# Patient Record
Sex: MALE | Race: WHITE | NOT HISPANIC OR LATINO | Employment: FULL TIME | URBAN - METROPOLITAN AREA
[De-identification: names, ages, dates, MRNs, and addresses within clinical notes are randomized per-mention and may not be internally consistent; named-entity substitution may affect disease eponyms.]

---

## 2018-12-06 ENCOUNTER — OFFICE VISIT - HEALTHEAST (OUTPATIENT)
Dept: FAMILY MEDICINE | Facility: CLINIC | Age: 28
End: 2018-12-06

## 2018-12-06 DIAGNOSIS — R50.9 FEVER: ICD-10-CM

## 2018-12-06 DIAGNOSIS — K52.9 ACUTE COLITIS: ICD-10-CM

## 2018-12-06 LAB
ANION GAP SERPL CALCULATED.3IONS-SCNC: 13 MMOL/L (ref 5–18)
BASOPHILS # BLD AUTO: 0 THOU/UL (ref 0–0.2)
BASOPHILS NFR BLD AUTO: 0 % (ref 0–2)
BUN SERPL-MCNC: 17 MG/DL (ref 8–22)
CHLORIDE BLD-SCNC: 99 MMOL/L (ref 98–107)
CO2 SERPL-SCNC: 25 MMOL/L (ref 22–31)
CREAT SERPL-MCNC: 1.1 MG/DL (ref 0.8–1.5)
DEPRECATED S PYO AG THROAT QL EIA: NORMAL
EOSINOPHIL # BLD AUTO: 0 THOU/UL (ref 0–0.4)
EOSINOPHIL NFR BLD AUTO: 0 % (ref 0–6)
ERYTHROCYTE [DISTWIDTH] IN BLOOD BY AUTOMATED COUNT: 12.1 % (ref 11–14.5)
FLUAV AG SPEC QL IA: NORMAL
FLUBV AG SPEC QL IA: NORMAL
GLUCOSE BLD-MCNC: 111 MG/DL (ref 70–125)
HCT VFR BLD AUTO: 44.8 % (ref 40–54)
HGB BLD-MCNC: 16.2 G/DL (ref 14–18)
LYMPHOCYTES # BLD AUTO: 0.7 THOU/UL (ref 0.8–4.4)
LYMPHOCYTES NFR BLD AUTO: 7 % (ref 20–40)
MCH RBC QN AUTO: 32.3 PG (ref 27–34)
MCHC RBC AUTO-ENTMCNC: 36.2 G/DL (ref 32–36)
MCV RBC AUTO: 89 FL (ref 80–100)
MONOCYTES # BLD AUTO: 0.9 THOU/UL (ref 0–0.9)
MONOCYTES NFR BLD AUTO: 9 % (ref 2–10)
NEUTROPHILS # BLD AUTO: 8 THOU/UL (ref 2–7.7)
NEUTROPHILS NFR BLD AUTO: 83 % (ref 50–70)
PLATELET # BLD AUTO: 151 THOU/UL (ref 140–440)
PMV BLD AUTO: 8.3 FL (ref 8.5–12.5)
POTASSIUM BLD-SCNC: 3.9 MMOL/L (ref 3.5–5.5)
RBC # BLD AUTO: 5.01 MILL/UL (ref 4.4–6.2)
SODIUM SERPL-SCNC: 137 MMOL/L (ref 136–145)
WBC: 9.6 THOU/UL (ref 4–11)

## 2018-12-07 LAB — GROUP A STREP BY PCR: NORMAL

## 2018-12-10 ENCOUNTER — OFFICE VISIT - HEALTHEAST (OUTPATIENT)
Dept: FAMILY MEDICINE | Facility: CLINIC | Age: 28
End: 2018-12-10

## 2018-12-10 DIAGNOSIS — H65.93 FLUID LEVEL BEHIND TYMPANIC MEMBRANE OF BOTH EARS: ICD-10-CM

## 2018-12-10 DIAGNOSIS — K52.9 COLITIS: ICD-10-CM

## 2018-12-10 ASSESSMENT — MIFFLIN-ST. JEOR: SCORE: 1701.07

## 2019-01-31 ENCOUNTER — COMMUNICATION - HEALTHEAST (OUTPATIENT)
Dept: ALLERGY | Facility: CLINIC | Age: 29
End: 2019-01-31

## 2020-10-01 ENCOUNTER — HOSPITAL ENCOUNTER (EMERGENCY)
Facility: CLINIC | Age: 30
Discharge: HOME | End: 2020-10-01
Attending: EMERGENCY MEDICINE | Admitting: EMERGENCY MEDICINE
Payer: COMMERCIAL

## 2020-10-01 VITALS
RESPIRATION RATE: 16 BRPM | SYSTOLIC BLOOD PRESSURE: 122 MMHG | DIASTOLIC BLOOD PRESSURE: 77 MMHG | HEART RATE: 72 BPM | OXYGEN SATURATION: 98 %

## 2020-10-01 DIAGNOSIS — T18.108A IMPACTED ESOPHAGEAL FOREIGN BODY, INITIAL ENCOUNTER: ICD-10-CM

## 2020-10-01 PROCEDURE — 99283 EMERGENCY DEPT VISIT LOW MDM: CPT

## 2020-10-01 PROCEDURE — 250N000009 HC RX 250: Performed by: EMERGENCY MEDICINE

## 2020-10-01 PROCEDURE — 250N000013 HC RX MED GY IP 250 OP 250 PS 637: Performed by: EMERGENCY MEDICINE

## 2020-10-01 RX ORDER — LORAZEPAM 2 MG/ML
INJECTION INTRAMUSCULAR
Status: DISCONTINUED
Start: 2020-10-01 | End: 2020-10-01 | Stop reason: HOSPADM

## 2020-10-01 RX ORDER — ONDANSETRON 2 MG/ML
INJECTION INTRAMUSCULAR; INTRAVENOUS
Status: DISCONTINUED
Start: 2020-10-01 | End: 2020-10-01 | Stop reason: HOSPADM

## 2020-10-01 RX ORDER — PANTOPRAZOLE SODIUM 40 MG/1
40 TABLET, DELAYED RELEASE ORAL ONCE
Status: COMPLETED | OUTPATIENT
Start: 2020-10-01 | End: 2020-10-01

## 2020-10-01 RX ORDER — PANTOPRAZOLE SODIUM 40 MG/1
40 TABLET, DELAYED RELEASE ORAL DAILY
Qty: 30 TABLET | Refills: 0 | Status: SHIPPED | OUTPATIENT
Start: 2020-10-01 | End: 2020-10-31

## 2020-10-01 RX ADMIN — LIDOCAINE HYDROCHLORIDE 30 ML: 20 SOLUTION ORAL; TOPICAL at 05:17

## 2020-10-01 RX ADMIN — PANTOPRAZOLE SODIUM 40 MG: 40 TABLET, DELAYED RELEASE ORAL at 05:17

## 2020-10-01 ASSESSMENT — ENCOUNTER SYMPTOMS
TROUBLE SWALLOWING: 1
VOMITING: 1

## 2020-10-01 NOTE — LETTER
October 1, 2020      To Whom It May Concern:      Ino Thapa was seen in our Emergency Department today, 10/01/20.  I expect his condition to improve over the next few days.  He may return to work/school on Monday, 10/5/2020 .    Sincerely,          Karen Jeff RN

## 2020-10-01 NOTE — ED PROVIDER NOTES
"  History     Chief Complaint:  Dysphagia    HPI   Ino Thapa is a 29 year old male who presents with dysphagia. The patient reports yesterday around 1900 he had an episode of painful swallowing and had another episode tonight while eating pork. He describes his pain as a \"swallow that gets suck and slithers down\" and he tries to spit it out. He endorses 2 episodes of emesis prior to arrival. He notes originally he was throwing up food and now it is a brown liquid. He denies a history of GERD and any recent throat surgeries. Of note, he notes his mother has had episodes of the same symptoms in the past.     Allergies:  No known drug allergies    Medications:    The patient is not currently taking any prescribed medications.     Past Medical History:    The patient does not have any past pertinent medical history.     Past Surgical History:    History reviewed. No pertinent surgical history.     Family History:    History reviewed. No pertinent family history.      Social History:  Patient is unaccompanied to the ED  Marital Status:  Single      Review of Systems   HENT: Positive for trouble swallowing.    Gastrointestinal: Positive for vomiting.   All other systems reviewed and are negative.      Physical Exam     Patient Vitals for the past 24 hrs:   BP Pulse Resp SpO2   10/01/20 0518 122/77 72 16 98 %     Physical Exam    Constitutional:  Appears well-developed and well-nourished. Cooperative. Anxious and uncomfortable and occasionally spitting up salvia.   HENT:   Head:    Atraumatic.   Mouth/Throat:   Oropharynx is without erythema or exudate and mucous     membranes are moist.   Eyes:    Conjunctivae normal and EOM are normal.      Pupils are equal, round, and reactive to light.   Neck:    Normal range of motion. Neck supple.   Cardiovascular:  Normal rate, regular rhythm, normal heart sounds and radial and    dorsalis pedis pulses are 2+ and symmetric.    Pulmonary/Chest:  Effort normal and breath " sounds normal.   Abdominal:   Soft. Bowel sounds are normal.      No splenomegaly or hepatomegaly. No tenderness. No rebound.   Musculoskeletal:  Normal range of motion. No edema and no tenderness.   Neurological:  Alert. Normal strength. No cranial nerve deficit. GCS15.  Skin:    Skin is warm and dry.   Psychiatric:   Normal mood and affect.      Emergency Department Course     Interventions:  0350 Ativan 2 mg/ml     0350 Zofran 4 mg IV     0517 GI Cocktail - Maalox 15 mL, Viscous Lidocaine 15 mL, 30 mL suspension PO     0517 Protonix 40 mg PO    Emergency Department Course:  Past medical records, nursing notes, and vitals reviewed.    0307 I performed an exam of the patient as documented above.     0452 I rechecked the patient and discussed the results of their workup thus far.     Findings and plan explained to the Patient. Patient discharged home with instructions regarding supportive care, medications, and reasons to return. The importance of close follow-up was reviewed.     Impression & Plan         Medical Decision Making:  Ino Thapa is a 29 year old male who presents today with esophageal foreign body sensation, pain and difficulty swallowing. Per history, the patient's symptoms began around 1900 last night while he was eating pot roast. The patient has been having difficulty swallowing food to eat since that time, and even began vomiting or regurgitating with attempts to drink fluids. We did establish a peripheral IV, and the patient was given Pepcid, Ativan, Zofran for nausea.  He drank easy gas, and then we then attempted sips of water and patient was able to swallow. The patient likely has an esophageal obstruction, most likely related to pot roast from yesterday.  After the EZ gas, he was given a dose of oral Protonix which he tolerated without difficulty.  There is no sign of airway compromise. Patient's symptoms could be consistent with eosinophilic esophagitis. Similar symptoms run in his  family. He does not chronically have reflux. I will start him on Protonix. He will call GI today to schedule follow up.     Diagnosis:    ICD-10-CM   1. Impacted esophageal foreign body, initial encounter  T18.108A     Disposition:  Discharged to home.    Discharge Medications:  New Prescriptions    PANTOPRAZOLE (PROTONIX) 40 MG EC TABLET    Take 1 tablet (40 mg) by mouth daily for 30 doses     Scribe Disclosure:  I, Latasha Wylie, am serving as a scribe at 4:46 AM on 10/1/2020 to document services personally performed by Atris Bowen MD based on my observations and the provider's statements to me.        Artis Bowen MD  10/01/20 0749

## 2020-10-01 NOTE — ED AVS SNAPSHOT
[facility] Emergency Dept  [address] [place] [address]  Phone: [phone]  Fax: [phone]                                    Ino Thapa   MRN: [id number]    Department: [facility] Emergency Dept   Date of Visit: 10/1/2020           After Visit Summary Signature Page    I have received my discharge instructions, and my questions have been answered. I have discussed any challenges I see with this plan with the nurse or doctor.    ..........................................................................................................................................  Patient/Patient Representative Signature      ..........................................................................................................................................  Patient Representative Print Name and Relationship to Patient    ..................................................               ................................................  Date                                   Time    ..........................................................................................................................................  Reviewed by Signature/Title    ...................................................              ..............................................  Date                                               Time          22EPIC Rev 08/18

## 2020-10-01 NOTE — DISCHARGE INSTRUCTIONS
Call Gastroenterologist today to discuss follow up. You will likely need endoscopy, a camera study of your esophagus and stomach.    Soft diet, chew well.

## 2021-01-04 ENCOUNTER — HEALTH MAINTENANCE LETTER (OUTPATIENT)
Age: 31
End: 2021-01-04

## 2021-06-02 VITALS — BODY MASS INDEX: 23.35 KG/M2 | HEIGHT: 70 IN | WEIGHT: 163.1 LBS

## 2021-06-02 VITALS — WEIGHT: 164.1 LBS

## 2021-06-22 NOTE — PROGRESS NOTES
Chief Complaint   Patient presents with     Chills     yesterday     Fever     yesterday 101.7     Cough     yesterday     Nausea     yesterday      Generalized Body Aches       HPI:  Ino Thapa is a 28 y.o. Male with PMHx of asthma who presents today complaining of fever, nausea, and body aches x 1 day. Tmax 101.8. Last dose of Ibuprofen was 4 hours ago. He was also given Dramamine for the nausea. He has not vomited. He is a , and he has traveled outside of the [place]. Most of his international travel is to [place]. She report low suprapubic intermittent abdominal pain. No hx of abdominal surgery. He denies any diarrhea or constipation. He has not had any CP, but does feel slightly shortness of breath.  He has not had his albuterol with this illness. He has not had a flu shot this year. He denies any nasal congestion, cough, or runny nose. He reports a sore throat, but suspects it is because he was sleeping with his mouth open and his throat was dry. He denies any urinary symptoms. He has not had any dizziness or lightheadedness with the exception of when he stands up suddenly. His fluid intake has decreased because of the nausea.       History obtained from the patient.    Problem List:  There are no relevant problems documented for this patient.      Past Medical History:   Diagnosis Date     Asthma unknown    Reported by patient       Social History     Tobacco Use     Smoking status: Never Smoker     Smokeless tobacco: Never Used   Substance Use Topics     Alcohol use: Not on file       Review of Systems   Constitutional: Positive for chills, fatigue and fever.   HENT: Negative for congestion, rhinorrhea and sore throat.    Respiratory: Positive for shortness of breath. Negative for cough and wheezing.    Cardiovascular: Negative for chest pain.   Gastrointestinal: Positive for abdominal pain (Low central) and nausea. Negative for diarrhea and vomiting.   Genitourinary: Negative for dysuria  and frequency.   Musculoskeletal:        (+) bodyaches   Skin: Negative for rash.   Allergic/Immunologic: Negative for immunocompromised state.   Neurological: Negative for dizziness and light-headedness.   All other systems reviewed and are negative.      Vitals:    12/06/18 0709   BP: 100/60   Pulse: (!) 105   Resp: 18   Temp: (!) 101.8  F (38.8  C)   TempSrc: Oral   SpO2: 98%   Weight: 164 lb 1.6 oz (74.4 kg)       Physical Exam   Constitutional: He appears well-developed and well-nourished. No distress.   Patient appears very tired, but is alert enough to answer questions accurately.    HENT:   Head: Normocephalic and atraumatic.   Right Ear: External ear normal.   Left Ear: External ear normal.   Mouth/Throat: Uvula is midline and oropharynx is clear and moist. No oropharyngeal exudate, posterior oropharyngeal edema or posterior oropharyngeal erythema.   Eyes: Conjunctivae are normal. Right eye exhibits no discharge. Left eye exhibits no discharge.   Cardiovascular: Normal rate, regular rhythm and normal heart sounds.   Pulmonary/Chest: Effort normal and breath sounds normal. No respiratory distress.   Abdominal: There is tenderness in the left lower quadrant. There is no rebound and no guarding.   (-)obturator, psoas, Rovsing's sign. Abdominal pain is somewhat generalized, but is sharp on the right lumbar. When pressing on RLL his pain radiates toward the epigastric area.    Lymphadenopathy:     He has cervical adenopathy.        Left cervical: Superficial cervical (possible mild) adenopathy present.   Skin: He is not diaphoretic.   Psychiatric: He has a normal mood and affect. His behavior is normal. Judgment and thought content normal.         Labs:  Recent Results (from the past 72 hour(s))   Influenza A/B Rapid Test- Nasal Swab   Result Value Ref Range    Influenza  A, Rapid Antigen No Influenza A antigen detected No Influenza A antigen detected    Influenza B, Rapid Antigen No Influenza B antigen detected  No Influenza B antigen detected   Rapid Strep A Screen-Throat   Result Value Ref Range    Rapid Strep A Antigen No Group A Strep detected, presumptive negative No Group A Strep detected, presumptive negative   ISTAT CHEM 7 ([facility] ONLY)   Result Value Ref Range    Sodium 137 136 - 145 mmol/L    Potassium 3.9 3.5 - 5.5 mmol/L    CO2 25 22 - 31 mmol/L    Chloride 99 98 - 107 mmol/L    Anion Gap, Calculation 13 5 - 18 mmol/L    Glucose 111 70 - 125 mg/dL    BUN 17 8 - 22 mg/dL    Creatinine 1.10 0.80 - 1.50 mg/dL   HM1 (CBC with Diff)   Result Value Ref Range    WBC 9.6 4.0 - 11.0 thou/uL    RBC 5.01 4.40 - 6.20 mill/uL    Hemoglobin 16.2 14.0 - 18.0 g/dL    Hematocrit 44.8 40.0 - 54.0 %    MCV 89 80 - 100 fL    MCH 32.3 27.0 - 34.0 pg    MCHC 36.2 (H) 32.0 - 36.0 g/dL    RDW 12.1 11.0 - 14.5 %    Platelets 151 140 - 440 thou/uL    MPV 8.3 (L) 8.5 - 12.5 fL    Neutrophils % 83 (H) 50 - 70 %    Lymphocytes % 7 (L) 20 - 40 %    Monocytes % 9 2 - 10 %    Eosinophils % 0 0 - 6 %    Basophils % 0 0 - 2 %    Neutrophils Absolute 8.0 (H) 2.0 - 7.7 thou/uL    Lymphocytes Absolute 0.7 (L) 0.8 - 4.4 thou/uL    Monocytes Absolute 0.9 0.0 - 0.9 thou/uL    Eosinophils Absolute 0.0 0.0 - 0.4 thou/uL    Basophils Absolute 0.0 0.0 - 0.2 thou/uL       Radiology:  Ct Abdomen Pelvis With Oral With Without Iv Contrast    Result Date: 12/6/2018  EXAM DATE:         12/06/2018 [facility] CT ABDOMEN, PELVIS WITH CONTRAST 12/6/2018 9:00 AM INDICATION: Lower abdominal pain, nausea and fever TECHNIQUE: CT abdomen and pelvis. Multiplanar reformation images (MPR). Dose reduction techniques were used. IV CONTRAST: 100 mL Isovue 370. COMPARISON: None. FINDINGS: LUNG BASES: Benign calcified granuloma left lower lobe. Visualized lungs otherwise clear. ABDOMEN: Liver, spleen, pancreas, kidneys, adrenal glands, gallbladder, bile ducts, and abdominal aorta are negative. PELVIS: Mucosal hyperenhancement mild pericolic fat edema and  prominence of the vasa recta. Colon, small intestine, appendix, ureters and bladder otherwise negative. MUSCULOSKELETAL: Negative. CONCLUSION: Mild to moderate nonspecific acute colitis right colon. Etiologies would include infectious or inflammatory bowel disease.       Clinical Decision Making:  Right colitis noted on CT today. Influenza and strep ruled out in clinic. Patient is febrile, so I suspect that the cause of this colitis is likely infectious over inflammatory. Chron's disease does remain on differential, UC is unlikely based on the lack of colitis on left colon. Patient denies any known family hx of chron's disease. With the patient I discussed the option of being admitted for pain control, nausea control, and fluid resuscitation; the patient preferred outpatient management and felt that his pain could be controlled with OTC medications. He was feeling slightly better upon discharge than when we originally came in. There were no peritoneal signs on physical exam today.  Abscess, perforation, and appendicitis were ruled out today. Patient is not experiencing any severe diarrhea or vomiting and his Chem 7 was normal today. He is able to tolerate PO fluids. I did feel comfortable with outpatient treatment, he was scheduled and appointment with Dr. Troncoso 4 days from now.   At the end of the encounter, I discussed results, diagnosis, medications. Discussed red flags for immediate return to clinic/ER. Patient understood and agreed to plan. Patient was stable for discharge.    1. Acute colitis  ondansetron (ZOFRAN ODT) 4 MG disintegrating tablet    ciprofloxacin HCl (CIPRO) 500 MG tablet    metroNIDAZOLE (FLAGYL) 500 MG tablet   2. Fever  Influenza A/B Rapid Test- Nasal Swab    Rapid Strep A Screen-Throat    HM1(CBC and Differential)    CT Abdomen Pelvis With Oral With Without IV Contrast    ISTAT CHEM 7 ([facility] ONLY)    HM1 (CBC with Diff)    Group A Strep, RNA Direct Detection, Throat          Patient Instructions   1. Your Influenza and Strep tests were negative today.   2. Your CT was negative for appendicitis, but it did show a colitis of the right colon. This can be cause by inflammatory or infectious processes. Since you are having chills and fevers I suspect that it is likely a infectious.  I will be starting you on Cipro and Flagyl for treatment.  3.  May take Tylenol for pain and fever control.  I recommend avoiding ibuprofen right now.  4.  Take Zofran as needed for nausea control.  Take frequent small sips of clear fluids.  Stick to a clear fluid diet for right now until your pain begins to resolve then slowly bring in thick liquids.   5.  Follow-up with primary care on Monday for reevaluation.  6. Seek emergency medical attention if you are unable to tolerate fluids and you are becoming dehydrated or if you pain is unmanageable on Tylenol.

## 2021-06-22 NOTE — PROGRESS NOTES
Assessment/Plan:        1. Colitis  Improving on the current therapy     Plan: Continue current management until done with the antibiotics.   Expect full recovery by the end of antiobiotics, otherwise follow up     2. Fluid level behind tympanic membrane of both ears  Try OTC flonase         May return to work without restrictions.       Total time spent with patient was 25  minutes with >50% of time spent in face-to-face counseling regarding the above plan.          Subjective:    Patient ID:   Ino Thapa is a 28 y.o. male here to establish care and follow to [facility] seen on 12/6/2018 for fever, nausea, body aches with a low suprapubic abdominal pain found to have right colitis noted on the CT scan and outpatient supportive management with Cipro Flagyl, and Zofran was preferred per patient.  He feels about 80% improvement with the current therapy and has about 3 more days of the antibiotics left.     In addition, asking to have his ears checked.        Review of Systems  General : negative  Ophthalmic : negative  ENT : feels full in the ears.   Respiratory : no cough, shortness of breath, or wheezing  Cardiovascular : no chest pain or dyspnea on exertion  Gastrointestinal : see HPI   Genito-Urinary :  no dysuria, trouble voiding, or hematuria  Musculoskeletal : negative  Dermatological : negative    Allergy: reviewed      The following patient's history were reviewed and updated as appropriate:   He  has a past medical history of Asthma (unknown).  He  has a past surgical history that includes No past surgeries.  His family history includes Depression in his mother; Diabetes in his mother; Drug abuse in his mother; Hyperlipidemia in his father.  He  reports that  has never smoked. he has never used smokeless tobacco. He reports that he drinks alcohol. He reports that he does not use drugs..      Outpatient Encounter Medications as of 12/10/2018   Medication Sig Dispense Refill     acetaminophen (TYLENOL ORAL)  "Take by mouth.       ascorbate calcium (VITAMIN C ORAL) Take by mouth.       ciprofloxacin HCl (CIPRO) 500 MG tablet Take 1 tablet (500 mg total) by mouth 2 (two) times a day for 7 days. 14 tablet 0     LYSINE ORAL Take by mouth.       metroNIDAZOLE (FLAGYL) 500 MG tablet Take 1 tablet (500 mg total) by mouth 3 (three) times a day for 7 days No alcohol while on this medication.. 21 tablet 0     MULTIVITAMIN ORAL Take by mouth.       ondansetron (ZOFRAN ODT) 4 MG disintegrating tablet Take 1 tablet (4 mg total) by mouth every 8 (eight) hours as needed for nausea. 30 tablet 0     ZINC ORAL Take by mouth.       No facility-administered encounter medications on file as of 12/10/2018.          Objective:   /62 (Patient Site: Right Arm, Patient Position: Sitting, Cuff Size: Adult Regular)   Pulse 74   Temp 97.7  F (36.5  C) (Oral)   Ht 5' 10\" (1.778 m)   Wt 163 lb 1.6 oz (74 kg)   SpO2 98%   BMI 23.40 kg/m        Physical Exam  General Appearance:    Alert, well hydrated, no distress,    Eyes:    PERRL, conjunctiva/corneas clear,    ENT:   DOV elder, Lips, mucosa, and tongue normal; teeth and gums normal   Neck:   Supple, symmetrical, trachea midline, no adenopathy;        thyroid:  No enlargement/tenderness/nodules; no carotid    bruit or JVD   Lungs:     Clear to auscultation bilaterally, respirations unlabored   Heart:    Regular rate and rhythm, S1 and S2 normal, no murmur, rub   or gallop   Abdomen:     Soft, non-tender, normal bowel sounds, no rebound or guarding, no masses, no organomegaly   Extremities:   Extremities normal, atraumatic, no cyanosis or edema   Skin:   Skin color, texture, turgor normal, no rashes or lesions      "

## 2021-10-11 ENCOUNTER — HEALTH MAINTENANCE LETTER (OUTPATIENT)
Age: 31
End: 2021-10-11

## 2022-01-30 ENCOUNTER — HEALTH MAINTENANCE LETTER (OUTPATIENT)
Age: 32
End: 2022-01-30

## 2022-07-03 ENCOUNTER — OFFICE VISIT (OUTPATIENT)
Dept: URGENT CARE | Facility: URGENT CARE | Age: 32
End: 2022-07-03
Payer: COMMERCIAL

## 2022-07-03 VITALS
DIASTOLIC BLOOD PRESSURE: 68 MMHG | TEMPERATURE: 97.3 F | SYSTOLIC BLOOD PRESSURE: 130 MMHG | HEART RATE: 102 BPM | OXYGEN SATURATION: 98 %

## 2022-07-03 DIAGNOSIS — T14.8XXA OPEN WOUND: Primary | ICD-10-CM

## 2022-07-03 PROCEDURE — 99213 OFFICE O/P EST LOW 20 MIN: CPT | Performed by: FAMILY MEDICINE

## 2022-07-03 RX ORDER — ALBUTEROL SULFATE 90 UG/1
AEROSOL, METERED RESPIRATORY (INHALATION)
COMMUNITY
Start: 2021-10-12

## 2022-07-03 RX ORDER — DOXYCYCLINE 100 MG/1
100 CAPSULE ORAL 2 TIMES DAILY
Qty: 14 CAPSULE | Refills: 0 | Status: SHIPPED | OUTPATIENT
Start: 2022-07-03 | End: 2022-07-10

## 2022-07-03 ASSESSMENT — PAIN SCALES - GENERAL: PAINLEVEL: NO PAIN (0)

## 2022-07-03 NOTE — PROGRESS NOTES
SUBJECTIVE:   Ino Thapa is a 31 year old male presenting with a chief complaint of a painless, pimple-like lesion near the left inguinal fold  that was growing until the lesion was scratched open about 4 days ago.  Since then, the lesion has been oozing out small amounts of blood since then.    No spreading redness.    Onset of symptoms was a few days ago.    Treatment measures tried include Neosporin ointments, cautery sticks (used in veterinary medicine), Band-Aids, direct pressure.      No history of bleeding disorders.    .  Patient is leaving for a one-week trip to [facility].        Current Outpatient Medications   Medication Sig Dispense Refill     acetaminophen (TYLENOL) 32 mg/mL liquid        albuterol (PROAIR HFA/PROVENTIL HFA/VENTOLIN HFA) 108 (90 Base) MCG/ACT inhaler INHALE 1 TO 2 INHALATIONS BY MOUTH EVERY 4 TO 6 HOURS AS NEEDED FOR WHEEZING       LYSINE PO        Multiple Vitamins-Minerals (ZINC PO)        VITAMIN D PO        Social History     Tobacco Use     Smoking status: Not on file     Smokeless tobacco: Not on file   Substance Use Topics     Alcohol use: Not on file       ROS:  CONSTITUTIONAL:negative for fevers.    INTEGUMENTARY/SKIN:  Positive for a wound on the left groin region.     OBJECTIVE:  /68   Pulse 102   Temp 97.3  F (36.3  C) (Tympanic)   SpO2 98%   GENERAL APPEARANCE: healthy, alert and no distress  SKIN: the left inguinal region has a tan-colored scab with an erythematous margin.  No red streaks. No fluctuance.  No active hemorrhage.      ASSESSMENT:   Open wound, currently healing.      PLAN:  Keep the wound covered for the next week with a Band-Aid or with gauze.  Change the dressing once a day.      Continue the over the counter antibiotic ointment.     Avoid scratching or rubbing the wound.      Fill the printed prescription for Doxycycline (100 mg PO BID x 7 days) if you see any spreading redness or red streaks from the wound.      Apply something warm  over the wound for 15 minutes at a time, every 3 hours while awake.      Follow up if not better in 10 days.     Montana Rogers MD

## 2022-09-24 ENCOUNTER — HEALTH MAINTENANCE LETTER (OUTPATIENT)
Age: 32
End: 2022-09-24

## 2023-05-08 ENCOUNTER — HEALTH MAINTENANCE LETTER (OUTPATIENT)
Age: 33
End: 2023-05-08

## 2024-07-14 ENCOUNTER — HEALTH MAINTENANCE LETTER (OUTPATIENT)
Age: 34
End: 2024-07-14

## 2024-09-03 ENCOUNTER — MYC MEDICAL ADVICE (OUTPATIENT)
Dept: SURGERY | Facility: CLINIC | Age: 34
End: 2024-09-03
Payer: COMMERCIAL

## 2025-02-19 ENCOUNTER — OFFICE VISIT (OUTPATIENT)
Dept: PEDIATRICS | Facility: CLINIC | Age: 35
End: 2025-02-19
Payer: COMMERCIAL

## 2025-02-19 VITALS
WEIGHT: 171.4 LBS | SYSTOLIC BLOOD PRESSURE: 132 MMHG | OXYGEN SATURATION: 99 % | DIASTOLIC BLOOD PRESSURE: 84 MMHG | RESPIRATION RATE: 16 BRPM | TEMPERATURE: 97.8 F | BODY MASS INDEX: 24.54 KG/M2 | HEART RATE: 74 BPM | HEIGHT: 70 IN

## 2025-02-19 DIAGNOSIS — R22.31 MASS OF RIGHT AXILLA: ICD-10-CM

## 2025-02-19 DIAGNOSIS — Z11.3 SCREEN FOR STD (SEXUALLY TRANSMITTED DISEASE): ICD-10-CM

## 2025-02-19 DIAGNOSIS — Z11.59 NEED FOR HEPATITIS C SCREENING TEST: Primary | ICD-10-CM

## 2025-02-19 DIAGNOSIS — Z12.83 SKIN CANCER SCREENING: ICD-10-CM

## 2025-02-19 LAB — T PALLIDUM AB SER QL: NONREACTIVE

## 2025-02-19 PROCEDURE — 90472 IMMUNIZATION ADMIN EACH ADD: CPT | Performed by: PHYSICIAN ASSISTANT

## 2025-02-19 PROCEDURE — 86780 TREPONEMA PALLIDUM: CPT | Performed by: PHYSICIAN ASSISTANT

## 2025-02-19 PROCEDURE — 99214 OFFICE O/P EST MOD 30 MIN: CPT | Mod: 25 | Performed by: PHYSICIAN ASSISTANT

## 2025-02-19 PROCEDURE — 36415 COLL VENOUS BLD VENIPUNCTURE: CPT | Performed by: PHYSICIAN ASSISTANT

## 2025-02-19 PROCEDURE — 90715 TDAP VACCINE 7 YRS/> IM: CPT | Performed by: PHYSICIAN ASSISTANT

## 2025-02-19 PROCEDURE — 90471 IMMUNIZATION ADMIN: CPT | Performed by: PHYSICIAN ASSISTANT

## 2025-02-19 PROCEDURE — 90746 HEPB VACCINE 3 DOSE ADULT IM: CPT | Performed by: PHYSICIAN ASSISTANT

## 2025-02-19 PROCEDURE — 86803 HEPATITIS C AB TEST: CPT | Performed by: PHYSICIAN ASSISTANT

## 2025-02-19 RX ORDER — EMTRICITABINE AND TENOFOVIR DISOPROXIL FUMARATE 200; 300 MG/1; MG/1
1 TABLET, FILM COATED ORAL DAILY
COMMUNITY

## 2025-02-19 ASSESSMENT — ENCOUNTER SYMPTOMS
SWOLLEN GLANDS: 0
MYALGIAS: 0
ABDOMINAL PAIN: 0
ANOREXIA: 0
ARTHRALGIAS: 0
JOINT SWELLING: 0
NECK PAIN: 0
VERTIGO: 0
CHANGE IN BOWEL HABIT: 0
WEAKNESS: 0
CHILLS: 0
SORE THROAT: 0
VOMITING: 0
NAUSEA: 0
NUMBNESS: 0
FATIGUE: 0
HEADACHES: 0
DIAPHORESIS: 0
VISUAL CHANGE: 0
COUGH: 0
FEVER: 0

## 2025-02-19 ASSESSMENT — ASTHMA QUESTIONNAIRES
ACT_TOTALSCORE: 25
QUESTION_4 LAST FOUR WEEKS HOW OFTEN HAVE YOU USED YOUR RESCUE INHALER OR NEBULIZER MEDICATION (SUCH AS ALBUTEROL): NOT AT ALL
QUESTION_3 LAST FOUR WEEKS HOW OFTEN DID YOUR ASTHMA SYMPTOMS (WHEEZING, COUGHING, SHORTNESS OF BREATH, CHEST TIGHTNESS OR PAIN) WAKE YOU UP AT NIGHT OR EARLIER THAN USUAL IN THE MORNING: NOT AT ALL
QUESTION_1 LAST FOUR WEEKS HOW MUCH OF THE TIME DID YOUR ASTHMA KEEP YOU FROM GETTING AS MUCH DONE AT WORK, SCHOOL OR AT HOME: NONE OF THE TIME
QUESTION_5 LAST FOUR WEEKS HOW WOULD YOU RATE YOUR ASTHMA CONTROL: COMPLETELY CONTROLLED
ACT_TOTALSCORE: 25
QUESTION_2 LAST FOUR WEEKS HOW OFTEN HAVE YOU HAD SHORTNESS OF BREATH: NOT AT ALL

## 2025-02-19 ASSESSMENT — PAIN SCALES - GENERAL: PAINLEVEL_OUTOF10: NO PAIN (0)

## 2025-02-19 NOTE — PROGRESS NOTES
Assessment & Plan     Need for hepatitis C screening test  No symptoms   - Hepatitis C Screen Reflex to HCV RNA Quant and Genotype  - Hepatitis C Screen Reflex to HCV RNA Quant and Genotype    Screen for STD (sexually transmitted disease)  No symptoms   Hiv, gcc normal recently  - Treponema Abs w Reflex to RPR and Titer  - Treponema Abs w Reflex to RPR and Titer    Mass of right axilla  Suspect lymph node. No other nodes noted. ultrasound to better evaluate.   Follow up based on results. Otherwise no other concerning symptoms   - US Axillary Right    Skin cancer screening  Right flank, mole, has been there for years w/o changes.  Discussed skin checks, skin care  Referral to derm   - Adult Dermatology  Referral                  Subjective   Ino is a 34 year old, presenting for the following health issues:  Mass        2/19/2025     2:11 PM   Additional Questions   Roomed by Hansa EVANS   Accompanied by Self         2/19/2025     2:11 PM   Patient Reported Additional Medications   Patient reports taking the following new medications No     Mass  This is a new problem. The current episode started 1 to 4 weeks ago. The problem occurs constantly. The problem has been gradually worsening. Pertinent negatives include no abdominal pain, anorexia, arthralgias, change in bowel habit, chest pain, chills, congestion, coughing, diaphoresis, fatigue, fever, headaches, joint swelling, myalgias, nausea, neck pain, numbness, rash, sore throat, swollen glands, urinary symptoms, vertigo, visual change, vomiting or weakness. Nothing (touching) aggravates the symptoms. He has tried nothing for the symptoms. The treatment provided no relief.   History of Present Illness       Reason for visit:  Mass under right arm  Symptom onset:  3-4 weeks ago  Symptoms include:  Tender mass  Symptom intensity:  Mild  Symptom progression:  Improving  Had these symptoms before:  No  Prior treatment description:  NA  What makes it  "worse:  No  What makes it better:  No    He eats 2-3 servings of fruits and vegetables daily.He consumes 0 sweetened beverage(s) daily.He exercises with enough effort to increase his heart rate 60 or more minutes per day.  He exercises with enough effort to increase his heart rate 7 days per week.   He is taking medications regularly.         Lump under right arm, three weeks. Grew in first week, tender. Since that time, no longer tender, bothersome only with touched. No changes in size   No fevers  Never had in past.    Taking Truvada from online provider. Testing neg for hiv, gcc    Skin lesion right flank. No changes in years                    Objective    /84 (BP Location: Right arm, Patient Position: Sitting, Cuff Size: Adult Regular)   Pulse 74   Temp 97.8  F (36.6  C) (Oral)   Resp 16   Ht 1.766 m (5' 9.53\")   Wt 77.7 kg (171 lb 6.4 oz)   SpO2 99%   BMI 24.93 kg/m    Body mass index is 24.93 kg/m .  Physical Exam   GENERAL: alert and no distress  RESP: lungs clear to auscultation - no rales, rhonchi or wheezes  CV: regular rate and rhythm, normal S1 S2, no S3 or S4, no murmur, click or rub, no peripheral edema  ABDOMEN: soft, nontender, no hepatosplenomegaly, no masses and bowel sounds normal  SKIN: right flank, 3mm slightly elevated dark colored lesion with blue under tones.   PSYCH: mentation appears normal, affect normal/bright  LYMPH: right axilla, 6mm round, firm, nontender mass. No other lesions noted. No supraclavicular, infraclavicular, cervical lymph nodes felt.             Signed Electronically by: Zonia Montilla PA-C    "

## 2025-02-20 LAB — HCV AB SERPL QL IA: NONREACTIVE

## 2025-07-19 ENCOUNTER — HEALTH MAINTENANCE LETTER (OUTPATIENT)
Age: 35
End: 2025-07-19